# Patient Record
Sex: MALE | Race: WHITE | NOT HISPANIC OR LATINO | Employment: FULL TIME | URBAN - METROPOLITAN AREA
[De-identification: names, ages, dates, MRNs, and addresses within clinical notes are randomized per-mention and may not be internally consistent; named-entity substitution may affect disease eponyms.]

---

## 2024-11-14 ENCOUNTER — OFFICE VISIT (OUTPATIENT)
Dept: URGENT CARE | Age: 39
End: 2024-11-14
Payer: COMMERCIAL

## 2024-11-14 VITALS
OXYGEN SATURATION: 98 % | WEIGHT: 175 LBS | DIASTOLIC BLOOD PRESSURE: 83 MMHG | TEMPERATURE: 98.2 F | SYSTOLIC BLOOD PRESSURE: 132 MMHG | HEART RATE: 87 BPM | BODY MASS INDEX: 25.92 KG/M2 | RESPIRATION RATE: 17 BRPM | HEIGHT: 69 IN

## 2024-11-14 DIAGNOSIS — S05.02XA ABRASION OF LEFT CORNEA, INITIAL ENCOUNTER: Primary | ICD-10-CM

## 2024-11-14 PROCEDURE — 3008F BODY MASS INDEX DOCD: CPT | Performed by: PHYSICIAN ASSISTANT

## 2024-11-14 PROCEDURE — 99070 SPECIAL SUPPLIES PHYS/QHP: CPT | Performed by: PHYSICIAN ASSISTANT

## 2024-11-14 PROCEDURE — 99213 OFFICE O/P EST LOW 20 MIN: CPT | Performed by: PHYSICIAN ASSISTANT

## 2024-11-14 RX ORDER — ERYTHROMYCIN 5 MG/G
OINTMENT OPHTHALMIC 4 TIMES DAILY
Qty: 3 G | Refills: 0 | Status: SHIPPED | OUTPATIENT
Start: 2024-11-14 | End: 2024-11-21

## 2024-11-14 RX ORDER — VARENICLINE TARTRATE 0.5 MG/1
TABLET, FILM COATED ORAL
COMMUNITY
Start: 2024-03-25

## 2024-11-14 RX ORDER — POLYMYXIN B SULFATE AND TRIMETHOPRIM 1; 10000 MG/ML; [USP'U]/ML
SOLUTION OPHTHALMIC
COMMUNITY
Start: 2024-05-11

## 2024-11-14 RX ORDER — GUAIFENESIN 1200 MG
TABLET, EXTENDED RELEASE 12 HR ORAL
COMMUNITY

## 2024-11-14 RX ORDER — OXYCODONE AND ACETAMINOPHEN 5; 325 MG/1; MG/1
TABLET ORAL
COMMUNITY

## 2024-11-14 ASSESSMENT — PAIN SCALES - GENERAL: PAINLEVEL_OUTOF10: 6

## 2024-11-14 ASSESSMENT — ENCOUNTER SYMPTOMS
EYE REDNESS: 1
EYE DISCHARGE: 0
EYE PAIN: 1

## 2024-11-14 NOTE — PROGRESS NOTES
"Subjective   Patient ID: Lino Conley is a 39 y.o. male. They present today with a chief complaint of Injury (Scratched eye ).    History of Present Illness  Left eye pain started last night after running into [employer].    Denies change in vision, contacts/glasses.      Injury      Past Medical History  Allergies as of 11/14/2024    (No Known Allergies)       (Not in a hospital admission)       No past medical history on file.    No past surgical history on file.     reports that he has been smoking cigarettes. He has never used smokeless tobacco.    Review of Systems  Review of Systems   Eyes:  Positive for pain and redness. Negative for discharge and visual disturbance.                                  Objective    Vitals:    11/14/24 1100   BP: 132/83   BP Location: Left arm   Patient Position: Sitting   BP Cuff Size: Adult   Pulse: 87   Resp: 17   Temp: 36.8 °C (98.2 °F)   TempSrc: Oral   SpO2: 98%   Weight: 79.4 kg (175 lb)   Height: 1.753 m (5' 9\")     No LMP for male patient.    Physical Exam  Vitals and nursing note reviewed.   Constitutional:       General: He is not in acute distress.  Eyes:      Extraocular Movements: Extraocular movements intact.      Pupils: Pupils are equal, round, and reactive to light.      Comments: Mild left conjunctival erythema.    Procedure;  2 drops of tetracaine  No FB visualized, inner upper and lower lids visualized.  Stained with fluorescein  Corneal abrasion at 6 O'clock over iris approx. 0.1 x 0.2 cm.       Neurological:      Mental Status: He is alert.         Procedures    Point of Care Test & Imaging Results from this visit  No results found for this visit on 11/14/24.   No results found.    Diagnostic study results (if any) were reviewed by Ava Espino PA-C.    Assessment/Plan   Allergies, medications, history, and pertinent labs/EKGs/Imaging reviewed by Ava Espino PA-C.         Orders and Diagnoses  Diagnoses and all orders for this visit:  Abrasion of left " cornea, initial encounter  -     erythromycin (Romycin) 5 mg/gram (0.5 %) ophthalmic ointment; Apply to left eye 4 times a day for 7 days. Apply Amount per Dose: 0.5 inch (~1 cm) per dose.      Medical Admin Record      Patient disposition: Home    Electronically signed by Ava Espino PA-C  11:50 AM